# Patient Record
Sex: MALE | Race: WHITE | NOT HISPANIC OR LATINO
[De-identification: names, ages, dates, MRNs, and addresses within clinical notes are randomized per-mention and may not be internally consistent; named-entity substitution may affect disease eponyms.]

---

## 2017-03-17 ENCOUNTER — TRANSCRIPTION ENCOUNTER (OUTPATIENT)
Age: 57
End: 2017-03-17

## 2017-04-04 ENCOUNTER — OUTPATIENT (OUTPATIENT)
Dept: OUTPATIENT SERVICES | Facility: HOSPITAL | Age: 57
LOS: 1 days | Discharge: HOME | End: 2017-04-04

## 2017-06-22 ENCOUNTER — OUTPATIENT (OUTPATIENT)
Dept: OUTPATIENT SERVICES | Facility: HOSPITAL | Age: 57
LOS: 1 days | Discharge: HOME | End: 2017-06-22

## 2017-06-22 DIAGNOSIS — I48.91 UNSPECIFIED ATRIAL FIBRILLATION: ICD-10-CM

## 2017-06-27 DIAGNOSIS — I10 ESSENTIAL (PRIMARY) HYPERTENSION: ICD-10-CM

## 2017-06-27 DIAGNOSIS — I25.10 ATHEROSCLEROTIC HEART DISEASE OF NATIVE CORONARY ARTERY WITHOUT ANGINA PECTORIS: ICD-10-CM

## 2017-06-27 DIAGNOSIS — E78.4 OTHER HYPERLIPIDEMIA: ICD-10-CM

## 2017-06-27 DIAGNOSIS — I48.91 UNSPECIFIED ATRIAL FIBRILLATION: ICD-10-CM

## 2017-06-27 DIAGNOSIS — R94.39 ABNORMAL RESULT OF OTHER CARDIOVASCULAR FUNCTION STUDY: ICD-10-CM

## 2017-06-28 DIAGNOSIS — I25.110 ATHEROSCLEROTIC HEART DISEASE OF NATIVE CORONARY ARTERY WITH UNSTABLE ANGINA PECTORIS: ICD-10-CM

## 2017-06-28 DIAGNOSIS — E78.00 PURE HYPERCHOLESTEROLEMIA, UNSPECIFIED: ICD-10-CM

## 2017-06-28 DIAGNOSIS — Z79.899 OTHER LONG TERM (CURRENT) DRUG THERAPY: ICD-10-CM

## 2017-10-02 ENCOUNTER — OUTPATIENT (OUTPATIENT)
Dept: OUTPATIENT SERVICES | Facility: HOSPITAL | Age: 57
LOS: 1 days | Discharge: HOME | End: 2017-10-02

## 2017-10-02 DIAGNOSIS — I48.91 UNSPECIFIED ATRIAL FIBRILLATION: ICD-10-CM

## 2017-10-02 DIAGNOSIS — Z00.00 ENCOUNTER FOR GENERAL ADULT MEDICAL EXAMINATION WITHOUT ABNORMAL FINDINGS: ICD-10-CM

## 2018-01-10 ENCOUNTER — TRANSCRIPTION ENCOUNTER (OUTPATIENT)
Age: 58
End: 2018-01-10

## 2018-03-03 ENCOUNTER — OUTPATIENT (OUTPATIENT)
Dept: OUTPATIENT SERVICES | Facility: HOSPITAL | Age: 58
LOS: 1 days | Discharge: HOME | End: 2018-03-03

## 2018-03-03 DIAGNOSIS — Z79.899 OTHER LONG TERM (CURRENT) DRUG THERAPY: ICD-10-CM

## 2018-03-03 DIAGNOSIS — E78.00 PURE HYPERCHOLESTEROLEMIA, UNSPECIFIED: ICD-10-CM

## 2018-03-03 DIAGNOSIS — I10 ESSENTIAL (PRIMARY) HYPERTENSION: ICD-10-CM

## 2018-03-03 DIAGNOSIS — R94.31 ABNORMAL ELECTROCARDIOGRAM [ECG] [EKG]: ICD-10-CM

## 2018-11-06 ENCOUNTER — OUTPATIENT (OUTPATIENT)
Dept: OUTPATIENT SERVICES | Facility: HOSPITAL | Age: 58
LOS: 1 days | Discharge: HOME | End: 2018-11-06

## 2018-11-06 DIAGNOSIS — Z00.00 ENCOUNTER FOR GENERAL ADULT MEDICAL EXAMINATION WITHOUT ABNORMAL FINDINGS: ICD-10-CM

## 2018-11-06 DIAGNOSIS — E78.2 MIXED HYPERLIPIDEMIA: ICD-10-CM

## 2018-11-06 DIAGNOSIS — I48.2 CHRONIC ATRIAL FIBRILLATION: ICD-10-CM

## 2018-11-12 ENCOUNTER — OUTPATIENT (OUTPATIENT)
Dept: OUTPATIENT SERVICES | Facility: HOSPITAL | Age: 58
LOS: 1 days | Discharge: HOME | End: 2018-11-12

## 2018-11-12 DIAGNOSIS — N39.0 URINARY TRACT INFECTION, SITE NOT SPECIFIED: ICD-10-CM

## 2019-05-22 ENCOUNTER — APPOINTMENT (OUTPATIENT)
Dept: CARDIOLOGY | Facility: CLINIC | Age: 59
End: 2019-05-22
Payer: COMMERCIAL

## 2019-05-22 PROCEDURE — 99214 OFFICE O/P EST MOD 30 MIN: CPT

## 2019-05-22 PROCEDURE — 93000 ELECTROCARDIOGRAM COMPLETE: CPT

## 2019-09-25 ENCOUNTER — APPOINTMENT (OUTPATIENT)
Dept: CARDIOLOGY | Facility: CLINIC | Age: 59
End: 2019-09-25
Payer: COMMERCIAL

## 2019-09-25 PROCEDURE — 99213 OFFICE O/P EST LOW 20 MIN: CPT

## 2019-09-25 PROCEDURE — 93000 ELECTROCARDIOGRAM COMPLETE: CPT

## 2019-11-03 ENCOUNTER — TRANSCRIPTION ENCOUNTER (OUTPATIENT)
Age: 59
End: 2019-11-03

## 2019-11-06 ENCOUNTER — APPOINTMENT (OUTPATIENT)
Dept: CARDIOLOGY | Facility: CLINIC | Age: 59
End: 2019-11-06
Payer: COMMERCIAL

## 2019-11-06 PROCEDURE — 93320 DOPPLER ECHO COMPLETE: CPT

## 2019-11-06 PROCEDURE — 93351 STRESS TTE COMPLETE: CPT

## 2019-11-06 PROCEDURE — 93325 DOPPLER ECHO COLOR FLOW MAPG: CPT

## 2020-03-04 ENCOUNTER — APPOINTMENT (OUTPATIENT)
Dept: CARDIOLOGY | Facility: CLINIC | Age: 60
End: 2020-03-04
Payer: COMMERCIAL

## 2020-03-04 PROCEDURE — 93000 ELECTROCARDIOGRAM COMPLETE: CPT

## 2020-03-04 PROCEDURE — 99213 OFFICE O/P EST LOW 20 MIN: CPT

## 2020-07-13 ENCOUNTER — APPOINTMENT (OUTPATIENT)
Dept: CARDIOLOGY | Facility: CLINIC | Age: 60
End: 2020-07-13
Payer: COMMERCIAL

## 2020-07-13 PROCEDURE — 93000 ELECTROCARDIOGRAM COMPLETE: CPT

## 2020-07-13 PROCEDURE — 99214 OFFICE O/P EST MOD 30 MIN: CPT

## 2020-09-30 ENCOUNTER — RECORD ABSTRACTING (OUTPATIENT)
Age: 60
End: 2020-09-30

## 2020-11-16 ENCOUNTER — APPOINTMENT (OUTPATIENT)
Dept: CARDIOLOGY | Facility: CLINIC | Age: 60
End: 2020-11-16
Payer: COMMERCIAL

## 2020-11-16 VITALS
HEIGHT: 60 IN | HEART RATE: 72 BPM | BODY MASS INDEX: 36.52 KG/M2 | WEIGHT: 186 LBS | TEMPERATURE: 98.6 F | SYSTOLIC BLOOD PRESSURE: 120 MMHG | DIASTOLIC BLOOD PRESSURE: 88 MMHG

## 2020-11-16 PROCEDURE — 99072 ADDL SUPL MATRL&STAF TM PHE: CPT

## 2020-11-16 PROCEDURE — 99213 OFFICE O/P EST LOW 20 MIN: CPT

## 2020-11-16 PROCEDURE — 93000 ELECTROCARDIOGRAM COMPLETE: CPT

## 2020-11-16 RX ORDER — ASPIRIN 81 MG
81 TABLET, DELAYED RELEASE (ENTERIC COATED) ORAL DAILY
Refills: 0 | Status: ACTIVE | COMMUNITY

## 2021-01-13 ENCOUNTER — APPOINTMENT (OUTPATIENT)
Dept: CARDIOLOGY | Facility: CLINIC | Age: 61
End: 2021-01-13
Payer: COMMERCIAL

## 2021-01-13 PROCEDURE — 99072 ADDL SUPL MATRL&STAF TM PHE: CPT

## 2021-01-13 PROCEDURE — 93320 DOPPLER ECHO COMPLETE: CPT

## 2021-01-13 PROCEDURE — 93351 STRESS TTE COMPLETE: CPT

## 2021-01-13 PROCEDURE — 93325 DOPPLER ECHO COLOR FLOW MAPG: CPT

## 2021-05-25 ENCOUNTER — NON-APPOINTMENT (OUTPATIENT)
Age: 61
End: 2021-05-25

## 2021-06-15 ENCOUNTER — APPOINTMENT (OUTPATIENT)
Dept: CARDIOLOGY | Facility: CLINIC | Age: 61
End: 2021-06-15
Payer: COMMERCIAL

## 2021-06-15 VITALS
TEMPERATURE: 98.3 F | WEIGHT: 182 LBS | BODY MASS INDEX: 24.65 KG/M2 | SYSTOLIC BLOOD PRESSURE: 110 MMHG | HEART RATE: 73 BPM | HEIGHT: 72 IN | DIASTOLIC BLOOD PRESSURE: 80 MMHG

## 2021-06-15 PROCEDURE — 99072 ADDL SUPL MATRL&STAF TM PHE: CPT

## 2021-06-15 PROCEDURE — 93000 ELECTROCARDIOGRAM COMPLETE: CPT

## 2021-06-15 PROCEDURE — 99213 OFFICE O/P EST LOW 20 MIN: CPT

## 2021-10-04 ENCOUNTER — APPOINTMENT (OUTPATIENT)
Dept: CARDIOLOGY | Facility: CLINIC | Age: 61
End: 2021-10-04
Payer: COMMERCIAL

## 2021-10-04 VITALS
DIASTOLIC BLOOD PRESSURE: 80 MMHG | BODY MASS INDEX: 24.92 KG/M2 | HEIGHT: 72 IN | SYSTOLIC BLOOD PRESSURE: 110 MMHG | HEART RATE: 66 BPM | TEMPERATURE: 98 F | WEIGHT: 184 LBS

## 2021-10-04 DIAGNOSIS — Z00.00 ENCOUNTER FOR GENERAL ADULT MEDICAL EXAMINATION W/OUT ABNORMAL FINDINGS: ICD-10-CM

## 2021-10-04 PROCEDURE — 93000 ELECTROCARDIOGRAM COMPLETE: CPT

## 2021-10-04 PROCEDURE — 99213 OFFICE O/P EST LOW 20 MIN: CPT

## 2021-10-21 ENCOUNTER — EMERGENCY (EMERGENCY)
Facility: HOSPITAL | Age: 61
LOS: 0 days | Discharge: HOME | End: 2021-10-21
Attending: EMERGENCY MEDICINE | Admitting: EMERGENCY MEDICINE
Payer: COMMERCIAL

## 2021-10-21 VITALS
HEART RATE: 70 BPM | SYSTOLIC BLOOD PRESSURE: 147 MMHG | RESPIRATION RATE: 18 BRPM | DIASTOLIC BLOOD PRESSURE: 92 MMHG | OXYGEN SATURATION: 99 % | TEMPERATURE: 98 F

## 2021-10-21 DIAGNOSIS — S61.011A LACERATION WITHOUT FOREIGN BODY OF RIGHT THUMB WITHOUT DAMAGE TO NAIL, INITIAL ENCOUNTER: ICD-10-CM

## 2021-10-21 DIAGNOSIS — M79.644 PAIN IN RIGHT FINGER(S): ICD-10-CM
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
## 2021-10-21 DIAGNOSIS — Z79.82 LONG TERM (CURRENT) USE OF ASPIRIN: ICD-10-CM

## 2021-10-21 DIAGNOSIS — I10 ESSENTIAL (PRIMARY) HYPERTENSION: ICD-10-CM
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
## 2021-10-21 DIAGNOSIS — E78.5 HYPERLIPIDEMIA, UNSPECIFIED: ICD-10-CM

## 2021-10-21 PROCEDURE — 12041 INTMD RPR N-HF/GENIT 2.5CM/<: CPT

## 2021-10-21 PROCEDURE — 99283 EMERGENCY DEPT VISIT LOW MDM: CPT | Mod: 25

## 2021-10-21 NOTE — ED PROVIDER NOTE - OBJECTIVE STATEMENT
60 y.o male presents to the ED for evaluation of R thumb lac.  cutting bread and sustained laceration.  Acute pain, throbbing, mild severity, no radiation of pain.   UTD on tdap.  no paresthesias or difficulty w/ ROM.

## 2021-10-21 NOTE — ED PROVIDER NOTE - ATTENDING CONTRIBUTION TO CARE
59 y/o m w/ pmhx of afibon asa 81 mg, htn, hld, presents with R thumb pad laceration after cutting bread and accidently cutting his finer.  No fever, chills, nausea, vomiting, numbness/tingling, decreased sensation, hearing a click/feeling a pop, foreign body, or any other trauma.     utd on tetanus.    on exam: WDWN appearing in nad, No swelling or ecchymoses. R thumb finger pad ~ .5 cm laceration, well approximated, no foreign body or tendon rupture in a blood less field. no erythema  or streaking, no warmth to palpation, no crepitus, induration, fluctuance, no discharge, no signs of trauma, no abscess, (+) soft compartments. Radial pulses 2/4 b/l. Cap refill < 2 seconds, No obvious bony deformity. Good finger oppilation, FROM of R wrist in flexion, extension, ulna and radial deviation. No snuff box tenderness.    FROM at PIP, DIP, MCP  in flexion and extension, abduction and adduction, no pain to palpation or weakness. FROM of R elbow in flexion, extensions, supination and pronation, and R shoulder in flexion, extension, abduction, and adduction with no pain to palpation.

## 2021-10-21 NOTE — ED PROVIDER NOTE - NSFOLLOWUPINSTRUCTIONS_ED_ALL_ED_FT

## 2021-10-21 NOTE — ED PROVIDER NOTE - PATIENT PORTAL LINK FT
You can access the FollowMyHealth Patient Portal offered by [facility] by registering at the following website: [email or web address]. By joining [employer]’s FollowMyHealth portal, you will also be able to view your health information using other applications (apps) compatible with our system.

## 2021-10-21 NOTE — ED PROVIDER NOTE - CARE PROVIDER_API CALL
Omar Schulte)  Orthopaedic Surgery  [address] [place] [address]  Phone: [phone]  Fax: [phone]  Follow Up Time: 1-3 Days

## 2021-10-21 NOTE — ED PROVIDER NOTE - PHYSICAL EXAMINATION
CONST: Well appearing in NAD  EYES:  Sclera and conjunctiva clear.  MS: mild TTP R thumb, FROM against resistance, no difficulty w/ ROM, cap refill < 2 seconds. radial pulses 2+ bilaterally  SKIN: 1.5 cm linear laceration distal R thumb, no FB, no active bleeding.   NEURO: R hand NVI

## 2021-10-21 NOTE — ED PROVIDER NOTE - PROGRESS NOTE DETAILS
ED Attending ASHISH Nazario  Wound copiously irrigated, lac repaired, wound care discussed in detail, pt aware of timing for suture removal, signs and symptoms to return for, understands can return here or pmd, will also follow up with ortho, pt comfortable, and agrees with plan, will follow up.

## 2021-10-21 NOTE — ED PROVIDER NOTE - CARE PLAN
Stopping Smoking: Care Instructions Your Care Instructions Cigarette smokers crave the nicotine in cigarettes. Giving it up is much harder than simply changing a habit. Your body has to stop craving the nicotine. It is hard to quit, but you can do it. There are many tools that people use to quit smoking. You may find that combining tools works best for you. There are several steps to quitting. First you get ready to quit. Then you get support to help you. After that, you learn new skills and behaviors to become a nonsmoker. For many people, a necessary step is getting and using medicine. Your doctor will help you set up the plan that best meets your needs. You may want to attend a smoking cessation program to help you quit smoking. When you choose a program, look for one that has proven success. Ask your doctor for ideas. You will greatly increase your chances of success if you take medicine as well as get counseling or join a cessation program. 
Some of the changes you feel when you first quit tobacco are uncomfortable. Your body will miss the nicotine at first, and you may feel short-tempered and grumpy. You may have trouble sleeping or concentrating. Medicine can help you deal with these symptoms. You may struggle with changing your smoking habits and rituals. The last step is the tricky one: Be prepared for the smoking urge to continue for a time. This is a lot to deal with, but keep at it. You will feel better. Follow-up care is a key part of your treatment and safety. Be sure to make and go to all appointments, and call your doctor if you are having problems. It's also a good idea to know your test results and keep a list of the medicines you take. How can you care for yourself at home? · Ask your family, friends, and coworkers for support. You have a better chance of quitting if you have help and support. 1 · Join a support group, such as Nicotine Anonymous, for people who are trying to quit smoking. · Consider signing up for a smoking cessation program, such as the American Lung Association's Freedom from Smoking program. 
· Get text messaging support. Go to the website at www.smokefree. gov to sign up for the [facility] program. 
· Set a quit date. Pick your date carefully so that it is not right in the middle of a big deadline or stressful time. Once you quit, do not even take a puff. Get rid of all ashtrays and lighters after your last cigarette. Clean your house and your clothes so that they do not smell of smoke. · Learn how to be a nonsmoker. Think about ways you can avoid those things that make you reach for a cigarette. ? Avoid situations that put you at greatest risk for smoking. For some people, it is hard to have a drink with friends without smoking. For others, they might skip a coffee break with coworkers who smoke. ? Change your daily routine. Take a different route to work or eat a meal in a different place. · Cut down on stress. Calm yourself or release tension by doing an activity you enjoy, such as reading a book, taking a hot bath, or gardening. · Talk to your doctor or pharmacist about nicotine replacement therapy, which replaces the nicotine in your body. You still get nicotine but you do not use tobacco. Nicotine replacement products help you slowly reduce the amount of nicotine you need. These products come in several forms, many of them available over-the-counter: ? Nicotine patches ? Nicotine gum and lozenges ? Nicotine inhaler · Ask your doctor about bupropion (Wellbutrin) or varenicline (Chantix), which are prescription medicines. They do not contain nicotine. They help you by reducing withdrawal symptoms, such as stress and anxiety. · Some people find hypnosis, acupuncture, and massage helpful for ending the smoking habit. · Eat a healthy diet and get regular exercise. Having healthy habits will help your body move past its craving for nicotine. · Be prepared to keep trying. Most people are not successful the first few times they try to quit. Do not get mad at yourself if you smoke again. Make a list of things you learned and think about when you want to try again, such as next week, next month, or next year. Where can you learn more? Go to http://www.gray.com/ Enter E228 in the search box to learn more about \"Stopping Smoking: Care Instructions. \" Current as of: March 12, 2020               Content Version: 12.6 © 8126-2658 [employer], [employer] ([place]). Care instructions adapted under license by [employer] (which disclaims liability or warranty for this information). If you have questions about a medical condition or this instruction, always ask your healthcare professional. [address] any warranty or liability for your use of this information. Principal Discharge DX:	Finger laceration

## 2021-10-21 NOTE — ED PROVIDER NOTE - NS ED ROS FT
CONST: No fever, chills or bodyaches  MS:  + thumb pain.   SKIN: + laceration   NEURO: No headache, dizziness, paresthesias

## 2022-02-16 ENCOUNTER — APPOINTMENT (OUTPATIENT)
Dept: CARDIOLOGY | Facility: CLINIC | Age: 62
End: 2022-02-16
Payer: COMMERCIAL

## 2022-02-16 PROCEDURE — 93000 ELECTROCARDIOGRAM COMPLETE: CPT | Mod: 59

## 2022-02-16 PROCEDURE — 99214 OFFICE O/P EST MOD 30 MIN: CPT

## 2022-02-16 PROCEDURE — 93246 EXT ECG>7D<15D RECORDING: CPT

## 2022-06-15 ENCOUNTER — APPOINTMENT (OUTPATIENT)
Dept: CARDIOLOGY | Facility: CLINIC | Age: 62
End: 2022-06-15
Payer: COMMERCIAL

## 2022-06-15 PROCEDURE — 93000 ELECTROCARDIOGRAM COMPLETE: CPT

## 2022-06-15 PROCEDURE — 99214 OFFICE O/P EST MOD 30 MIN: CPT

## 2022-08-09 ENCOUNTER — NON-APPOINTMENT (OUTPATIENT)
Age: 62
End: 2022-08-09

## 2022-08-10 ENCOUNTER — EMERGENCY (EMERGENCY)
Facility: HOSPITAL | Age: 62
LOS: 0 days | Discharge: HOME | End: 2022-08-10
Attending: EMERGENCY MEDICINE | Admitting: EMERGENCY MEDICINE

## 2022-08-10 VITALS
RESPIRATION RATE: 18 BRPM | HEIGHT: 72 IN | TEMPERATURE: 100 F | OXYGEN SATURATION: 97 % | SYSTOLIC BLOOD PRESSURE: 138 MMHG | HEART RATE: 115 BPM | DIASTOLIC BLOOD PRESSURE: 80 MMHG | WEIGHT: 182.98 LBS

## 2022-08-10 VITALS — TEMPERATURE: 99 F | RESPIRATION RATE: 18 BRPM | HEART RATE: 92 BPM | OXYGEN SATURATION: 98 %

## 2022-08-10 DIAGNOSIS — U07.1 COVID-19: ICD-10-CM

## 2022-08-10 DIAGNOSIS — J02.9 ACUTE PHARYNGITIS, UNSPECIFIED: ICD-10-CM

## 2022-08-10 DIAGNOSIS — J36 PERITONSILLAR ABSCESS: ICD-10-CM

## 2022-08-10 PROCEDURE — 99284 EMERGENCY DEPT VISIT MOD MDM: CPT

## 2022-08-10 RX ORDER — BENZOCAINE 10 %
1 GEL (GRAM) MUCOUS MEMBRANE ONCE
Refills: 0 | Status: COMPLETED | OUTPATIENT
Start: 2022-08-10 | End: 2022-08-10

## 2022-08-10 RX ADMIN — Medication 1 SPRAY(S): at 18:26

## 2022-08-10 NOTE — ED PROVIDER NOTE - CLINICAL SUMMARY MEDICAL DECISION MAKING FREE TEXT BOX
Peritonsillar Abscess.  Seen and evaluated by ENT.  s/p I&D with successful drainage of 2.5cc pus.  D/C with PO Abx, Medrol dose devin and NSAIDs for pain. Patient to f/u with Dr. Erazo in 1-2 weeks.  Return to ED if fevers >101.3, intractable pain/vomiting.

## 2022-08-10 NOTE — ED PROVIDER NOTE - PROGRESS NOTE DETAILS
: consulted ENT, drained at [facility], culture sent, dc on clindamycin as well as medrol devin and ENT follow up within 1 week

## 2022-08-10 NOTE — ED PROVIDER NOTE - NS ED ATTENDING STATEMENT MOD
This was a shared visit with the DAI. I reviewed and verified the documentation and independently performed the documented:

## 2022-08-10 NOTE — ED PROVIDER NOTE - PHYSICAL EXAMINATION
Physical Exam    Vital Signs: I have reviewed the initial vital signs.  Constitutional: well-nourished, appears stated age, no acute distress  Eyes: Conjunctiva pink, Sclera clear, PERRLA, EOMI.  Throat: uvula deviated to the L, tonsillar erythema and swelling, with R sided PTA noted as well as exudate noted on R   Cardiovascular: S1 and S2, regular rate, regular rhythm, well-perfused extremities, radial pulses equal and 2+  Respiratory: unlabored respiratory effort, clear to auscultation bilaterally no wheezing, rales and rhonchi  Gastrointestinal: soft, non-tender abdomen, no pulsatile mass, normal bowl sounds  Musculoskeletal: supple neck, no lower extremity edema, no midline tenderness  Integumentary: warm, dry, no rash  Neurologic: awake, alert, cranial nerves II-XII grossly intact, extremities’ motor and sensory functions grossly intact  Psychiatric: appropriate mood, appropriate affect

## 2022-08-10 NOTE — ED ADULT TRIAGE NOTE - CHIEF COMPLAINT QUOTE
PT sent in from urgent care for r/o airway abscess. Neck is swollen upon exam. 8/6 tested positive for COVID. PT was given Tylenol 1000mg for fever and decadron 10 IM. Pt reports difficulty swallowing PT sent in from urgent care for r/o airway abscess. Neck is swollen upon exam. 8/6 tested positive for COVID. PT was given Tylenol 1000mg for fever and decadron 10 IM. Pt reports difficulty swallowing.  Cleared by Dr. Blanchard in [facility] for [facility].

## 2022-08-10 NOTE — ED PROVIDER NOTE - PATIENT PORTAL LINK FT
You can access the FollowMyHealth Patient Portal offered by [facility] by registering at the following website: [email or web address]. By joining [employer]’s FollowMyHealth portal, you will also be able to view your health information using other applications (apps) compatible with our system.

## 2022-08-10 NOTE — ED PROVIDER NOTE - CARE PROVIDER_API CALL
Aidan Erazo)  Otolaryngology  [address] [place] [address], [address] [place] [address]  Phone: [phone]  Fax: [phone]  Follow Up Time: 1-3 Days

## 2022-08-10 NOTE — ED ADULT NURSE NOTE - CHIEF COMPLAINT QUOTE
PT sent in from urgent care for r/o airway abscess. Neck is swollen upon exam. 8/6 tested positive for COVID. PT was given Tylenol 1000mg for fever and decadron 10 IM. Pt reports difficulty swallowing.  Cleared by Dr. Blanchard in [facility] for [facility].

## 2022-08-10 NOTE — CONSULT NOTE ADULT - ASSESSMENT
60 y/o male with R peritonsillar abscess. s/p successful drainage of 2.5cc puss  - pain control  - antipyretic prn  - PO abx  - Medrol dose pack  - f/u with Dr. Erazo as o/p in 1-2 weeks  - Return to ED if fevers >101.3, intractable pain/vomiting  - Pt understands and agrees with plan.

## 2022-08-10 NOTE — CONSULT NOTE ADULT - SUBJECTIVE AND OBJECTIVE BOX
HPI:  62 y/o male, COVID+, presenting with throat discomfort x1 day. States he woke up with fever and dysphagia/odynophagia and hoarseness. Tmax 103 at home. Denies any difficulty breathing/SOB. Able to tolerate liquids with some discomfort. Pt was seen at an urgent care and given decadron prior to being sent to ED.    REVIEW OF SYSTEMS   [x] A ten-point review of systems was otherwise negative except as noted.    Allergies    No Known Allergies    MEDICATIONS:  Antiinfectives:     IV fluids:    Hematologic/Anticoagulation:    Pain medications/Neuro:    Endocrine Medications:     All other standing medications:     All other PRN medications:    Vital Signs Last 24 Hrs  T(C): 37.9 (10 Aug 2022 16:53), Max: 37.9 (10 Aug 2022 16:53)  T(F): 100.3 (10 Aug 2022 16:53), Max: 100.3 (10 Aug 2022 16:53)  HR: 115 (10 Aug 2022 16:53) (115 - 115)  BP: 138/80 (10 Aug 2022 16:53) (138/80 - 138/80)  RR: 18 (10 Aug 2022 16:53) (18 - 18)  SpO2: 97% (10 Aug 2022 16:53) (97% - 97%)    Parameters below as of 10 Aug 2022 16:53  Patient On (Oxygen Delivery Method): room air    PHYSICAL EXAM:  Gen: NAD, awake/alert. No drooling or pooling of secretions. No respiratory distress. No stridor or stertor. + hoarseness  Skin: Warm/pink. Non diaphoretic  Head: Normocephalic, Atraumatic  Face: No edema, erythema, or fluctuance. Parotid glands soft without mass  Eyes: EOMI, no scleral injection  Nose: Nares bilaterally patent, no discharge or bleeding.   Mouth: Oral mucosa moist/pink, uvula deviated to the L, + mild edema to R peritonsillar space. no fluctuance, minimal tenderness. (Pt. actually complaining of L throat pain)  Neck: Flat, supple, + b/l anterior lympadenopathy  Resp: Breathing easily, no wheezing  CV: extremities without edema  GI: Soft, nondistended   Musculoskeletal: moving all extremities x 4    Procedure: Posterior oropharynx sprayed with topical benzocaine for local anesthetic. Using 19g needle able to aspirate 2.5cc puss from R peritonsillar space. post procedure pt reports slight improvement of symptoms.

## 2022-08-10 NOTE — ED PROVIDER NOTE - NS ED ROS FT
Constitutional: (-) fever  Eyes/ENT: (-) blurry vision, (-) epistaxis (+) sore throat  Cardiovascular: (-) chest pain, (-) syncope  Respiratory: (-) cough, (-) shortness of breath  Gastrointestinal: (-) vomiting, (-) diarrhea  Musculoskeletal: (-) neck pain, (-) back pain, (-) joint pain  Integumentary: (-) rash, (-) edema  Neurological: (-) headache, (-) altered mental status  Psychiatric: (-) hallucinations  Allergic/Immunologic: (-) pruritus

## 2022-08-10 NOTE — ED PROVIDER NOTE - NSFOLLOWUPINSTRUCTIONS_ED_ALL_ED_FT
Follow up with your primary medical doctor and the ENT provided within 1 week    Pharyngitis    WHAT YOU NEED TO KNOW:    Pharyngitis, or sore throat, is inflammation of the tissues and structures in your pharynx (throat). Pharyngitis is most often caused by bacteria. It may also be caused by a cold or flu virus. Other causes include smoking, allergies, or acid reflux.     DISCHARGE INSTRUCTIONS:    Call 911 for any of the following:     You have trouble breathing or swallowing because your throat is swollen or sore.        Return to the emergency department if:     You are drooling because it hurts too much to swallow.      Your fever is higher than 102°F (39°C) or lasts longer than 3 days.      You are confused.      You taste blood in your throat.    Contact your healthcare provider if:     Your throat pain gets worse.      You have a painful lump in your throat that does not go away after 5 days.      Your symptoms do not improve after 5 days.      You have questions or concerns about your condition or care.    Medicines: Viral pharyngitis will go away on its own without treatment. Your sore throat should start to feel better in 3 to 5 days for both viral and bacterial infections. You may need any of the following:     Antibiotics treat a bacterial infection.      NSAIDs, such as ibuprofen, help decrease swelling, pain, and fever. NSAIDs can cause stomach bleeding or kidney problems in certain people. If you take blood thinner medicine, always ask your healthcare provider if NSAIDs are safe for you. Always read the medicine label and follow directions.      Acetaminophen decreases pain and fever. It is available without a doctor's order. Ask how much to take and how often to take it. Follow directions. Acetaminophen can cause liver damage if not taken correctly.      Take your medicine as directed. Contact your healthcare provider if you think your medicine is not helping or if you have side effects. Tell him or her if you are allergic to any medicine. Keep a list of the medicines, vitamins, and herbs you take. Include the amounts, and when and why you take them. Bring the list or the pill bottles to follow-up visits. Carry your medicine list with you in case of an emergency.    Manage your symptoms:     Gargle salt water. Mix ¼ teaspoon salt in an 8 ounce glass of warm water and gargle. This may help decrease swelling in your throat.      Drink liquids as directed. You may need to drink more liquids than usual. Liquids may help soothe your throat and prevent dehydration. Ask how much liquid to drink each day and which liquids are best for you.      Use a cool-steam humidifier to help moisten the air in your room and calm your cough.      Soothe your throat with cough drops, ice, soft foods, or popsicles.    Prevent the spread of pharyngitis: Cover your mouth and nose when you cough or sneeze. Do not share food or drinks. Wash your hands often. Use soap and water. If soap and water are unavailable, use an alcohol based hand .     Follow up with your healthcare provider as directed: Write down your questions so you remember to ask them during your visits.        © Copyright [employer] 2019 All illustrations and images included in CareNotes are the copyrighted property of [employer]M., Inc. or [employer].

## 2022-08-10 NOTE — ED PROVIDER NOTE - OBJECTIVE STATEMENT
Pt is a 61 year old male with no PMH, however currently COVID pos presents to ED with complaints of sore throat. Pt states went to [facility] today, diagnosed with possible PTA sent in for further evaluation. Pt states pain is moderate, non radiating R sided with no alleviating factors. Endorses trismus, trouble with secretions and muffeled voie. Denies fever, chills, bodyaches, chest pain, sob, abdominal pain, NVCD

## 2022-08-12 ENCOUNTER — EMERGENCY (EMERGENCY)
Facility: HOSPITAL | Age: 62
LOS: 0 days | Discharge: HOME | End: 2022-08-12
Attending: STUDENT IN AN ORGANIZED HEALTH CARE EDUCATION/TRAINING PROGRAM | Admitting: STUDENT IN AN ORGANIZED HEALTH CARE EDUCATION/TRAINING PROGRAM

## 2022-08-12 VITALS
WEIGHT: 182.98 LBS | RESPIRATION RATE: 18 BRPM | TEMPERATURE: 99 F | DIASTOLIC BLOOD PRESSURE: 87 MMHG | HEIGHT: 72 IN | HEART RATE: 82 BPM | SYSTOLIC BLOOD PRESSURE: 155 MMHG | OXYGEN SATURATION: 100 %

## 2022-08-12 DIAGNOSIS — U07.1 COVID-19: ICD-10-CM

## 2022-08-12 DIAGNOSIS — I48.91 UNSPECIFIED ATRIAL FIBRILLATION: ICD-10-CM

## 2022-08-12 DIAGNOSIS — J02.9 ACUTE PHARYNGITIS, UNSPECIFIED: ICD-10-CM

## 2022-08-12 PROCEDURE — 99281 EMR DPT VST MAYX REQ PHY/QHP: CPT

## 2022-08-12 PROCEDURE — 99284 EMERGENCY DEPT VISIT MOD MDM: CPT

## 2022-08-12 NOTE — ED PROVIDER NOTE - NS ED ATTENDING STATEMENT MOD
Attending with This was a shared visit with the DAI. I reviewed and verified the documentation and independently performed the documented:

## 2022-08-12 NOTE — ED PROVIDER NOTE - CARE PROVIDER_API CALL
Aidan Erazo)  Otolaryngology  [address] [place] [address], [address] [place] [address]  Phone: [phone]  Fax: [phone]  Scheduled Appointment: 08/23/2022 07:15 AM

## 2022-08-12 NOTE — ED ADULT NURSE NOTE - CHIEF COMPLAINT QUOTE
no
Pt c/o sore throat after having abscess in back of throat drained pt states "It hurts to swallow and talk now". Pt reports testing + for covid on 8/6/22, denies fever.

## 2022-08-12 NOTE — CONSULT NOTE ADULT - SUBJECTIVE AND OBJECTIVE BOX
Pt is a 62yo Male who presents with throat pain. Pt was recently seen in the ED for PTA, s/p drainage at the bedside, pt also COVID +. Pt states his pain is different then his throat pain from two days ago, feels like his neck is swollen. PT states he feels like he is having a hard time swallowing and feels he is getting dehydrated. Pt has been taking his abx and steroids. Pt still with hoarse voice, no SOB/diff breathing.     PAST MEDICAL & SURGICAL HISTORY:  A-fib    Allergies    No Known Allergies    Intolerances    SOCIAL HISTORY:    FAMILY HISTORY:      REVIEW OF SYSTEMS   [x] A ten-point review of systems was otherwise negative except as noted.  [ ] Due to altered mental status/intubation, subjective information were not able to be obtained from patient. History was obtained, to the extent possible, from review of the chart and collateral sources of information.    Vital Signs Last 24 Hrs  T(C): 37.2 (12 Aug 2022 07:56), Max: 37.2 (12 Aug 2022 07:56)  T(F): 98.9 (12 Aug 2022 07:56), Max: 98.9 (12 Aug 2022 07:56)  HR: 82 (12 Aug 2022 07:56) (82 - 82)  BP: 155/87 (12 Aug 2022 07:56) (155/87 - 155/87)  RR: 18 (12 Aug 2022 07:56) (18 - 18)  SpO2: 100% (12 Aug 2022 07:56) (100% - 100%)    Parameters below as of 12 Aug 2022 07:56  Patient On (Oxygen Delivery Method): room air      GEN: NAD, awake and alert. No drooling or pooling of secretions. No stridor or stertor. Good vocal quality, slight hoarseness.   SKIN: Good color, non diaphoretic.  HEENT: Oral mucosa pink and moist. + erythema to the post OP. tonsils WNL. peritonsillar space without edema or fluctuance. no TTP to the peritonsillar space. No erythema or edema noted to buccal mucosa, tongue, FOM, uvula. Uvula midline.   NECK: Trachea midline, Neck supple, mild TTP to B/L lateral neck, no cervical LAD.  RESP: No dyspnea, non-labored breathing. No use of accessory muscles.   CARDIO: +S1/S2  ABDO: Soft, NT.  EXT: LANTIGUA x 4

## 2022-08-12 NOTE — ED PROVIDER NOTE - ATTENDING APP SHARED VISIT CONTRIBUTION OF CARE
61M with PMH PTA s/p I&D on 8/10 with ENT, d/c on clinda + solumedrol who presents to [facility] for gradually worsening throat pain with decreased oral intake d/t pain. Reports coughing/choking with thin liquids. He called ENT for a f/u apt but was given 1 month apt. Denies drooling, fever, chest pain, sob.     vs noted, triage note reviewed    Gen - NAD, Head - NCAT, Pharynx - clear, MMM, mild L paratonsillar swelling, no exudates, mild L submandibular swelling, Heart - RRR, no m/g/r, Lungs - CTAB, no w/c/r, Abdomen - soft, NT, ND, Skin - No rash, Neuro - A&O x3, equal strength and sensation, non-focal exam    a/p:  L throat pain s/p I&D 2d ago with ENT  protecting airway  ENT consult, reassess

## 2022-08-12 NOTE — ED PROVIDER NOTE - PHYSICAL EXAMINATION
Gen: Alert, NAD, well appearing  Head: NC, AT, PERRL, EOMI, normal lids/conjunctiva  ENT: normal hearing. + mild erythema to throat with minimal fullness to right side of throat  Neck: +supple, no tenderness/meningismus,  Pulm: Bilateral BS, normal resp effort, no wheeze/stridor/retractions  CV: S1S2  Abd: soft, NT/ND  Skin: no rash, warm/dry  Neuro: AAOx3, no sensory/motor deficits

## 2022-08-12 NOTE — ED PROVIDER NOTE - ATTENDING CONTRIBUTION TO CARE
61M with PMH PTA s/p I&D on 8/10 with ENT, d/c on clinda + solumedrol who presents to [facility] for gradually worsening throat pain with decreased oral intake d/t pain. Reports coughing/choking with thin liquids. He called ENT for a f/u apt but was given 1 month apt. Denies drooling, fever, chest pain, sob.     vs noted, triage note reviewed    Gen - NAD, Head - NCAT, Pharynx - clear, MMM, mild L paratonsillar swelling, no exudates, mild L submandibular swelling, Heart - RRR, no m/g/r, Lungs - CTAB, no w/c/r, Abdomen - soft, NT, ND, Skin - No rash, Neuro - A&O x3, equal strength and sensation, non-focal exam    a/p:  L throat pain s/p I&D 2d ago with ENT  protecting airway  ENT consult, reassess

## 2022-08-12 NOTE — ED ADULT TRIAGE NOTE - CHIEF COMPLAINT QUOTE
Pt c/o sore throat after having abscess in back of throat drained pt states "It hurts to swallow and talk now". Pt reports testing + for covid on 8/6/22, denies fever.

## 2022-08-12 NOTE — ED PROVIDER NOTE - PATIENT PORTAL LINK FT
You can access the FollowMyHealth Patient Portal offered by [facility] by registering at the following website: [email or web address]. By joining [employer]’s FollowMyHealth portal, you will also be able to view your health information using other applications (apps) compatible with our system.

## 2022-08-12 NOTE — ED PROVIDER NOTE - PROGRESS NOTE DETAILS
Seen by ENT MAXIMO Lobato. Patient can be dc home. F/U appt now 8/23 at 7:15am.  Patient tolerated PO in ED.

## 2022-08-12 NOTE — ED PROVIDER NOTE - CLINICAL SUMMARY MEDICAL DECISION MAKING FREE TEXT BOX
61M with PMH PTA s/p I&D on 8/10 with ENT, d/c on clinda + solumedrol who presents to [facility] for gradually worsening throat pain with decreased oral intake d/t pain. Patient tolerating secretions, tolerating p.o. liquids.  Patient seen by ENT and appropriate for close follow-up. I have fully discussed the medical management and delivery of care with the patient. I have discussed any available labs, imaging and treatment options with the patient. All Questions answered at the bedside and printed copies of all results provided and recommended to review with PCP. Patient confirms understanding and has been given detailed return precautions. Patient instructed to return to the ED should symptoms persist or worsen. Patient has demonstrated capacity and has verbalized understanding. Patient is well appearing upon discharge, ambulatory with a steady gait.

## 2022-08-12 NOTE — ED PROVIDER NOTE - OBJECTIVE STATEMENT
62 yo M hx of A-fib c/o sore throat. Patient was diagnosed with Covid 8/6. He has had sore throat for 4 days. Seen in ED 2 days ago  and had I&D of PTA and was discharged home with abx and steroids. He still has throat pain and difficulty swallowing. He called ENT and was given a f/u appt for 1 month later. No fevers.

## 2022-08-12 NOTE — CONSULT NOTE ADULT - ASSESSMENT
Pt is a 62yo Male who presents with throat pain. Pt was recently seen in the ED for PTA, s/p drainage at the bedside, pt also COVID +. No evidence of recurrent PTA, likely pharyngitis from COVID.     ·	if unable to tolerate PO, consider IV and bolus x 1  ·	cont abx  ·	cont supportive care  ·	no acute ENT intervention  ·	s/w ED team, w/d with attng

## 2022-08-15 ENCOUNTER — NON-APPOINTMENT (OUTPATIENT)
Age: 62
End: 2022-08-15

## 2022-08-19 ENCOUNTER — NON-APPOINTMENT (OUTPATIENT)
Age: 62
End: 2022-08-19

## 2022-08-23 ENCOUNTER — APPOINTMENT (OUTPATIENT)
Dept: OTOLARYNGOLOGY | Facility: CLINIC | Age: 62
End: 2022-08-23

## 2022-08-23 ENCOUNTER — NON-APPOINTMENT (OUTPATIENT)
Age: 62
End: 2022-08-23

## 2022-08-23 VITALS — WEIGHT: 172 LBS | BODY MASS INDEX: 23.3 KG/M2 | HEIGHT: 72 IN

## 2022-08-23 DIAGNOSIS — J40 BRONCHITIS, NOT SPECIFIED AS ACUTE OR CHRONIC: ICD-10-CM

## 2022-08-23 DIAGNOSIS — J01.90 ACUTE SINUSITIS, UNSPECIFIED: ICD-10-CM

## 2022-08-23 PROBLEM — I48.91 UNSPECIFIED ATRIAL FIBRILLATION: Chronic | Status: ACTIVE | Noted: 2022-08-12

## 2022-08-23 PROCEDURE — 99204 OFFICE O/P NEW MOD 45 MIN: CPT | Mod: 25

## 2022-08-23 PROCEDURE — 31575 DIAGNOSTIC LARYNGOSCOPY: CPT

## 2022-08-23 NOTE — PHYSICAL EXAM
[Nasal Endoscopy Performed] : nasal endoscopy was performed, see procedure section for findings [Normal] : mucosa is normal [Midline] : trachea located in midline position [de-identified] : purulent drainage right tonsils

## 2022-08-23 NOTE — REASON FOR VISIT
[Initial Evaluation] : an initial evaluation for [FreeTextEntry2] : Pharyngeal abscess and sinus issues

## 2022-08-23 NOTE — HISTORY OF PRESENT ILLNESS
[de-identified] : Patient presents today c/o Pharyngeal abscess.  He  was  in ED for Pharyngeal abscess 8/10/22 .  Minimal  improvement since  ED  visit.  Discharge with clindamycin  and  steroids, no imaging performed in ED.  Has regain  appetite .  Discomfort when swallowing . Occasionally coughing and gagging . Pt was able to sleep last night and has some improvement this morning.

## 2022-08-25 ENCOUNTER — APPOINTMENT (OUTPATIENT)
Dept: OTOLARYNGOLOGY | Facility: CLINIC | Age: 62
End: 2022-08-25

## 2022-08-25 DIAGNOSIS — J02.9 ACUTE PHARYNGITIS, UNSPECIFIED: ICD-10-CM

## 2022-08-25 DIAGNOSIS — R13.10 DYSPHAGIA, UNSPECIFIED: ICD-10-CM

## 2022-08-25 PROCEDURE — 99214 OFFICE O/P EST MOD 30 MIN: CPT | Mod: 25

## 2022-08-25 PROCEDURE — 31575 DIAGNOSTIC LARYNGOSCOPY: CPT

## 2022-08-25 NOTE — REASON FOR VISIT
[Subsequent Evaluation] : a subsequent evaluation for [FreeTextEntry2] :  Pharyngitis and bronchitis.

## 2022-08-25 NOTE — HISTORY OF PRESENT ILLNESS
[FreeTextEntry1] : Patient returns today for follow up Pharyngitis ad bronchitis. Patient started medication and is feeling much better.

## 2022-08-27 ENCOUNTER — RX RENEWAL (OUTPATIENT)
Age: 62
End: 2022-08-27

## 2022-08-31 LAB — EAR NOSE AND THROAT CULTURE: ABNORMAL

## 2022-09-22 ENCOUNTER — APPOINTMENT (OUTPATIENT)
Dept: OTOLARYNGOLOGY | Facility: CLINIC | Age: 62
End: 2022-09-22

## 2022-09-22 DIAGNOSIS — J38.4 EDEMA OF LARYNX: ICD-10-CM

## 2022-09-22 DIAGNOSIS — J39.1 OTHER ABSCESS OF PHARYNX: ICD-10-CM

## 2022-09-22 PROCEDURE — 31575 DIAGNOSTIC LARYNGOSCOPY: CPT

## 2022-09-22 PROCEDURE — 99213 OFFICE O/P EST LOW 20 MIN: CPT | Mod: 25

## 2022-11-09 ENCOUNTER — APPOINTMENT (OUTPATIENT)
Dept: CARDIOLOGY | Facility: CLINIC | Age: 62
End: 2022-11-09

## 2022-11-09 PROCEDURE — 99214 OFFICE O/P EST MOD 30 MIN: CPT | Mod: 25

## 2022-11-09 PROCEDURE — 93000 ELECTROCARDIOGRAM COMPLETE: CPT

## 2023-01-25 ENCOUNTER — RX RENEWAL (OUTPATIENT)
Age: 63
End: 2023-01-25

## 2023-03-08 ENCOUNTER — APPOINTMENT (OUTPATIENT)
Dept: CARDIOLOGY | Facility: CLINIC | Age: 63
End: 2023-03-08
Payer: COMMERCIAL

## 2023-03-08 PROCEDURE — 99214 OFFICE O/P EST MOD 30 MIN: CPT | Mod: 25

## 2023-03-08 PROCEDURE — 93000 ELECTROCARDIOGRAM COMPLETE: CPT

## 2023-04-30 ENCOUNTER — NON-APPOINTMENT (OUTPATIENT)
Age: 63
End: 2023-04-30

## 2023-07-05 ENCOUNTER — APPOINTMENT (OUTPATIENT)
Dept: CARDIOLOGY | Facility: CLINIC | Age: 63
End: 2023-07-05
Payer: COMMERCIAL

## 2023-07-05 DIAGNOSIS — I71.20 THORACIC AORTIC ANEURYSM, WITHOUT RUPTURE, UNSPECIFIED: ICD-10-CM

## 2023-07-05 DIAGNOSIS — R00.2 PALPITATIONS: ICD-10-CM

## 2023-07-05 PROCEDURE — 93320 DOPPLER ECHO COMPLETE: CPT

## 2023-07-05 PROCEDURE — 93325 DOPPLER ECHO COLOR FLOW MAPG: CPT

## 2023-07-05 PROCEDURE — 93351 STRESS TTE COMPLETE: CPT

## 2023-07-06 PROBLEM — R00.2 PALPITATIONS: Status: ACTIVE | Noted: 2022-02-18

## 2023-07-06 PROBLEM — I71.20 THORACIC AORTIC ANEURYSM WITHOUT RUPTURE: Status: ACTIVE | Noted: 2021-06-16

## 2023-09-29 ENCOUNTER — APPOINTMENT (OUTPATIENT)
Dept: HEMATOLOGY ONCOLOGY | Facility: CLINIC | Age: 63
End: 2023-09-29
Payer: COMMERCIAL

## 2023-09-29 ENCOUNTER — APPOINTMENT (OUTPATIENT)
Dept: INFUSION THERAPY | Facility: CLINIC | Age: 63
End: 2023-09-29
Payer: COMMERCIAL

## 2023-09-29 ENCOUNTER — OUTPATIENT (OUTPATIENT)
Dept: OUTPATIENT SERVICES | Facility: HOSPITAL | Age: 63
LOS: 1 days | End: 2023-09-29
Payer: COMMERCIAL

## 2023-09-29 VITALS
WEIGHT: 196 LBS | HEART RATE: 75 BPM | HEIGHT: 72 IN | TEMPERATURE: 98.4 F | BODY MASS INDEX: 26.55 KG/M2 | DIASTOLIC BLOOD PRESSURE: 71 MMHG | SYSTOLIC BLOOD PRESSURE: 131 MMHG

## 2023-09-29 DIAGNOSIS — Z86.79 PERSONAL HISTORY OF OTHER DISEASES OF THE CIRCULATORY SYSTEM: ICD-10-CM

## 2023-09-29 DIAGNOSIS — Z87.891 PERSONAL HISTORY OF NICOTINE DEPENDENCE: ICD-10-CM

## 2023-09-29 DIAGNOSIS — D50.8 OTHER IRON DEFICIENCY ANEMIAS: ICD-10-CM

## 2023-09-29 DIAGNOSIS — Z80.42 FAMILY HISTORY OF MALIGNANT NEOPLASM OF PROSTATE: ICD-10-CM

## 2023-09-29 DIAGNOSIS — D64.9 ANEMIA, UNSPECIFIED: ICD-10-CM

## 2023-09-29 PROCEDURE — 99204 OFFICE O/P NEW MOD 45 MIN: CPT

## 2023-09-30 DIAGNOSIS — D64.9 ANEMIA, UNSPECIFIED: ICD-10-CM

## 2023-10-06 ENCOUNTER — APPOINTMENT (OUTPATIENT)
Dept: INFUSION THERAPY | Facility: CLINIC | Age: 63
End: 2023-10-06

## 2023-10-06 ENCOUNTER — OUTPATIENT (OUTPATIENT)
Dept: OUTPATIENT SERVICES | Facility: HOSPITAL | Age: 63
LOS: 1 days | End: 2023-10-06
Payer: COMMERCIAL

## 2023-10-06 DIAGNOSIS — D64.9 ANEMIA, UNSPECIFIED: ICD-10-CM

## 2023-10-06 PROCEDURE — 96365 THER/PROPH/DIAG IV INF INIT: CPT

## 2023-10-06 PROCEDURE — 96367 TX/PROPH/DG ADDL SEQ IV INF: CPT

## 2023-10-06 RX ORDER — IRON SUCROSE 20 MG/ML
200 INJECTION, SOLUTION INTRAVENOUS ONCE
Refills: 0 | Status: COMPLETED | OUTPATIENT
Start: 2023-10-06 | End: 2023-10-06

## 2023-10-06 RX ADMIN — Medication 104 MILLIGRAM(S): at 16:32

## 2023-10-06 RX ADMIN — IRON SUCROSE 200 MILLIGRAM(S): 20 INJECTION, SOLUTION INTRAVENOUS at 17:30

## 2023-10-06 RX ADMIN — Medication 125 MILLIGRAM(S): at 16:55

## 2023-10-06 RX ADMIN — IRON SUCROSE 220 MILLIGRAM(S): 20 INJECTION, SOLUTION INTRAVENOUS at 17:00

## 2023-10-07 DIAGNOSIS — D64.9 ANEMIA, UNSPECIFIED: ICD-10-CM

## 2023-10-13 ENCOUNTER — LABORATORY RESULT (OUTPATIENT)
Age: 63
End: 2023-10-13

## 2023-10-13 ENCOUNTER — APPOINTMENT (OUTPATIENT)
Dept: INFUSION THERAPY | Facility: CLINIC | Age: 63
End: 2023-10-13

## 2023-10-13 ENCOUNTER — OUTPATIENT (OUTPATIENT)
Dept: OUTPATIENT SERVICES | Facility: HOSPITAL | Age: 63
LOS: 1 days | End: 2023-10-13
Payer: COMMERCIAL

## 2023-10-13 DIAGNOSIS — D64.9 ANEMIA, UNSPECIFIED: ICD-10-CM

## 2023-10-13 PROCEDURE — 96375 TX/PRO/DX INJ NEW DRUG ADDON: CPT

## 2023-10-13 PROCEDURE — 96365 THER/PROPH/DIAG IV INF INIT: CPT

## 2023-10-13 PROCEDURE — 36415 COLL VENOUS BLD VENIPUNCTURE: CPT

## 2023-10-13 PROCEDURE — 85027 COMPLETE CBC AUTOMATED: CPT

## 2023-10-13 RX ORDER — IRON SUCROSE 20 MG/ML
200 INJECTION, SOLUTION INTRAVENOUS ONCE
Refills: 0 | Status: COMPLETED | OUTPATIENT
Start: 2023-10-13 | End: 2023-10-13

## 2023-10-13 RX ADMIN — Medication 104 MILLIGRAM(S): at 16:25

## 2023-10-13 RX ADMIN — Medication 125 MILLIGRAM(S): at 16:55

## 2023-10-13 RX ADMIN — IRON SUCROSE 200 MILLIGRAM(S): 20 INJECTION, SOLUTION INTRAVENOUS at 16:21

## 2023-10-13 RX ADMIN — IRON SUCROSE 220 MILLIGRAM(S): 20 INJECTION, SOLUTION INTRAVENOUS at 16:08

## 2023-10-14 LAB
HCT VFR BLD CALC: 42.7 %
HGB BLD-MCNC: 13.8 G/DL
MCHC RBC-ENTMCNC: 24.3 PG
MCHC RBC-ENTMCNC: 32.3 G/DL
MCV RBC AUTO: 75.3 FL
PLATELET # BLD AUTO: 178 K/UL
PMV BLD: 11 FL
RBC # BLD: 5.67 M/UL
RBC # FLD: 15.5 %
WBC # FLD AUTO: 9.37 K/UL

## 2023-10-20 ENCOUNTER — APPOINTMENT (OUTPATIENT)
Dept: INFUSION THERAPY | Facility: CLINIC | Age: 63
End: 2023-10-20

## 2023-10-20 ENCOUNTER — OUTPATIENT (OUTPATIENT)
Dept: OUTPATIENT SERVICES | Facility: HOSPITAL | Age: 63
LOS: 1 days | End: 2023-10-20
Payer: COMMERCIAL

## 2023-10-20 DIAGNOSIS — D64.9 ANEMIA, UNSPECIFIED: ICD-10-CM

## 2023-10-20 PROCEDURE — 96367 TX/PROPH/DG ADDL SEQ IV INF: CPT

## 2023-10-20 PROCEDURE — 96365 THER/PROPH/DIAG IV INF INIT: CPT

## 2023-10-20 RX ORDER — IRON SUCROSE 20 MG/ML
200 INJECTION, SOLUTION INTRAVENOUS ONCE
Refills: 0 | Status: COMPLETED | OUTPATIENT
Start: 2023-10-20 | End: 2023-10-20

## 2023-10-20 RX ADMIN — IRON SUCROSE 220 MILLIGRAM(S): 20 INJECTION, SOLUTION INTRAVENOUS at 15:20

## 2023-10-20 RX ADMIN — Medication 104 MILLIGRAM(S): at 14:58

## 2023-10-20 RX ADMIN — Medication 125 MILLIGRAM(S): at 15:20

## 2023-10-20 RX ADMIN — IRON SUCROSE 200 MILLIGRAM(S): 20 INJECTION, SOLUTION INTRAVENOUS at 15:50

## 2023-10-27 ENCOUNTER — OUTPATIENT (OUTPATIENT)
Dept: OUTPATIENT SERVICES | Facility: HOSPITAL | Age: 63
LOS: 1 days | End: 2023-10-27
Payer: COMMERCIAL

## 2023-10-27 ENCOUNTER — APPOINTMENT (OUTPATIENT)
Dept: INFUSION THERAPY | Facility: CLINIC | Age: 63
End: 2023-10-27

## 2023-10-27 VITALS — DIASTOLIC BLOOD PRESSURE: 79 MMHG | HEART RATE: 63 BPM | SYSTOLIC BLOOD PRESSURE: 129 MMHG | TEMPERATURE: 97.6 F

## 2023-10-27 DIAGNOSIS — D64.9 ANEMIA, UNSPECIFIED: ICD-10-CM

## 2023-10-27 PROCEDURE — 96365 THER/PROPH/DIAG IV INF INIT: CPT

## 2023-10-27 PROCEDURE — 96367 TX/PROPH/DG ADDL SEQ IV INF: CPT

## 2023-10-27 RX ORDER — IRON SUCROSE 20 MG/ML
200 INJECTION, SOLUTION INTRAVENOUS ONCE
Refills: 0 | Status: COMPLETED | OUTPATIENT
Start: 2023-10-27 | End: 2023-10-27

## 2023-10-27 RX ADMIN — Medication 104 MILLIGRAM(S): at 14:19

## 2023-10-27 RX ADMIN — IRON SUCROSE 220 MILLIGRAM(S): 20 INJECTION, SOLUTION INTRAVENOUS at 14:42

## 2023-10-27 RX ADMIN — IRON SUCROSE 200 MILLIGRAM(S): 20 INJECTION, SOLUTION INTRAVENOUS at 15:12

## 2023-10-27 RX ADMIN — Medication 125 MILLIGRAM(S): at 14:40

## 2023-11-03 ENCOUNTER — APPOINTMENT (OUTPATIENT)
Dept: INFUSION THERAPY | Facility: CLINIC | Age: 63
End: 2023-11-03

## 2023-11-03 ENCOUNTER — OUTPATIENT (OUTPATIENT)
Dept: OUTPATIENT SERVICES | Facility: HOSPITAL | Age: 63
LOS: 1 days | End: 2023-11-03

## 2023-11-03 DIAGNOSIS — D64.9 ANEMIA, UNSPECIFIED: ICD-10-CM

## 2023-11-04 DIAGNOSIS — D64.9 ANEMIA, UNSPECIFIED: ICD-10-CM

## 2023-11-09 ENCOUNTER — APPOINTMENT (OUTPATIENT)
Dept: CARDIOLOGY | Facility: CLINIC | Age: 63
End: 2023-11-09
Payer: COMMERCIAL

## 2023-11-09 PROCEDURE — 93000 ELECTROCARDIOGRAM COMPLETE: CPT

## 2023-11-09 PROCEDURE — 99214 OFFICE O/P EST MOD 30 MIN: CPT | Mod: 25

## 2024-01-16 ENCOUNTER — LABORATORY RESULT (OUTPATIENT)
Age: 64
End: 2024-01-16

## 2024-01-16 ENCOUNTER — OUTPATIENT (OUTPATIENT)
Dept: OUTPATIENT SERVICES | Facility: HOSPITAL | Age: 64
LOS: 1 days | End: 2024-01-16
Payer: COMMERCIAL

## 2024-01-16 ENCOUNTER — APPOINTMENT (OUTPATIENT)
Dept: HEMATOLOGY ONCOLOGY | Facility: CLINIC | Age: 64
End: 2024-01-16
Payer: COMMERCIAL

## 2024-01-16 VITALS
DIASTOLIC BLOOD PRESSURE: 94 MMHG | SYSTOLIC BLOOD PRESSURE: 141 MMHG | HEIGHT: 72 IN | WEIGHT: 200 LBS | HEART RATE: 74 BPM | BODY MASS INDEX: 27.09 KG/M2 | TEMPERATURE: 97.5 F

## 2024-01-16 DIAGNOSIS — D50.8 OTHER IRON DEFICIENCY ANEMIAS: ICD-10-CM

## 2024-01-16 DIAGNOSIS — D64.9 ANEMIA, UNSPECIFIED: ICD-10-CM

## 2024-01-16 LAB
HCT VFR BLD CALC: 43.9 %
HGB BLD-MCNC: 15.5 G/DL
MCHC RBC-ENTMCNC: 28.1 PG
MCHC RBC-ENTMCNC: 35.3 G/DL
MCV RBC AUTO: 79.7 FL
PLATELET # BLD AUTO: 211 K/UL
PMV BLD: 9.6 FL
RBC # BLD: 5.51 M/UL
RBC # FLD: 13.8 %
WBC # FLD AUTO: 6.35 K/UL

## 2024-01-16 PROCEDURE — 82728 ASSAY OF FERRITIN: CPT

## 2024-01-16 PROCEDURE — 85027 COMPLETE CBC AUTOMATED: CPT

## 2024-01-16 PROCEDURE — 99213 OFFICE O/P EST LOW 20 MIN: CPT

## 2024-01-16 PROCEDURE — 36415 COLL VENOUS BLD VENIPUNCTURE: CPT

## 2024-01-16 NOTE — HISTORY OF PRESENT ILLNESS
[de-identified] : This is Fine, Aidan is 62 years old who presents here for initial Consultation for Iron Deficiency Anemia.    History of Present Illness     63 y/o M with PMHx of A-FIB S/P Ablation in 2015, he is on Aspirin daily, S/p EGD last month, Schedule for Colonoscopy in 10/5/23.   he complains of mild fatigue, no hematochezia, change in bowel habits, weight loss, bone pain, B symptoms, abdominal pain, vomiting, edema or SOB.   He is donating blood every 2 months for the last 3 years,  Most recent blood work from 9/5/23 with HGb is 13.4/42.4 , Low MCV is 76.1, normal WBCs and Platelet count.  Low Ferritin level is 9, Iron sat is 9%. Patient didn't tolerates the PO Iron, he developed pruritic rash with iron tablets.  [de-identified] : 1/16/24: Pt presents for follow up regarding his iron deficiency anemia. He had 4 doses of venofer 200 mg from 10/6/23 to 10/27/23. He had EGD/Colonoscopy done in 10/2023 and reportedly did not show any bleeds or polyps. Today, he has no complaint to offer. He further denies fever, blood in stool, chill, SOB, fatigue.

## 2024-01-16 NOTE — ASSESSMENT
[FreeTextEntry1] : 62 y/o M with history of A-FIB s/p ablation presents for follow up regarding his iron deficiency Anemia.  # Iron deficiency anemia likely due to frequency blood donation, resolved - unable to tolerate PO iron due to uricartia - EGD/Colonoscopy done in 10/5/23 and reportedly normal - s/p Venofer infusion weekly with Solu-Medrol 125 mg Iv (As a pre-medication) from 10/6/23 - 10/27/23 x 4 doses.  # A-FIB s/p ablation in 2015.  Plan: - CBC reviewed today shows resolution of anemia  - follow up ferritin level - pt may resume blood donation as long as CBC, iron studies with ferritin are routinely checked with PCP every 6 months   RTC PRN to see Dr. Hager.

## 2024-01-16 NOTE — CONSULT LETTER
[Dear  ___] : Dear  [unfilled], [Consult Letter:] : I had the pleasure of evaluating your patient, [unfilled]. [Please see my note below.] : Please see my note below. [Consult Closing:] : Thank you very much for allowing me to participate in the care of this patient.  If you have any questions, please do not hesitate to contact me. [Sincerely,] : Sincerely, [FreeTextEntry3] : Dr. YANNI Hager

## 2024-01-16 NOTE — REASON FOR VISIT
[Follow-Up Visit] : a follow-up visit for [FreeTextEntry2] : patient referred for Iron deficiency Anemia.

## 2024-01-17 DIAGNOSIS — D64.9 ANEMIA, UNSPECIFIED: ICD-10-CM

## 2024-01-17 LAB — FERRITIN SERPL-MCNC: 101 NG/ML

## 2024-02-07 ENCOUNTER — NON-APPOINTMENT (OUTPATIENT)
Age: 64
End: 2024-02-07

## 2024-03-19 ENCOUNTER — APPOINTMENT (OUTPATIENT)
Dept: CARDIOLOGY | Facility: CLINIC | Age: 64
End: 2024-03-19

## 2024-03-22 ENCOUNTER — APPOINTMENT (OUTPATIENT)
Dept: CARDIOLOGY | Facility: CLINIC | Age: 64
End: 2024-03-22
Payer: COMMERCIAL

## 2024-03-22 VITALS
HEIGHT: 72 IN | DIASTOLIC BLOOD PRESSURE: 80 MMHG | HEART RATE: 71 BPM | WEIGHT: 184 LBS | SYSTOLIC BLOOD PRESSURE: 110 MMHG | BODY MASS INDEX: 24.92 KG/M2

## 2024-03-22 DIAGNOSIS — I25.10 ATHEROSCLEROTIC HEART DISEASE OF NATIVE CORONARY ARTERY W/OUT ANGINA PECTORIS: ICD-10-CM

## 2024-03-22 DIAGNOSIS — I48.91 UNSPECIFIED ATRIAL FIBRILLATION: ICD-10-CM

## 2024-03-22 DIAGNOSIS — E78.5 HYPERLIPIDEMIA, UNSPECIFIED: ICD-10-CM

## 2024-03-22 PROCEDURE — G2211 COMPLEX E/M VISIT ADD ON: CPT

## 2024-03-22 PROCEDURE — 99214 OFFICE O/P EST MOD 30 MIN: CPT

## 2024-03-22 PROCEDURE — 93000 ELECTROCARDIOGRAM COMPLETE: CPT

## 2024-03-22 RX ORDER — ATORVASTATIN CALCIUM 20 MG/1
20 TABLET, FILM COATED ORAL
Qty: 90 | Refills: 3 | Status: ACTIVE | COMMUNITY
Start: 1900-01-01 | End: 1900-01-01

## 2024-03-22 RX ORDER — LEVOFLOXACIN 500 MG/1
500 TABLET, FILM COATED ORAL DAILY
Qty: 10 | Refills: 0 | Status: DISCONTINUED | COMMUNITY
Start: 2022-08-23 | End: 2024-03-22

## 2024-03-22 RX ORDER — PANTOPRAZOLE 40 MG/1
40 TABLET, DELAYED RELEASE ORAL
Qty: 90 | Refills: 0 | Status: DISCONTINUED | COMMUNITY
Start: 2022-08-25 | End: 2024-03-22

## 2024-03-22 NOTE — HISTORY OF PRESENT ILLNESS
[FreeTextEntry1] : 63-year-old male with a history of nonobstructive CAD, hyperlipidemia, atrial fibrillation (status post ablation in 2015) who presents for follow-up.  Since his last visit he has been doing well. He reports no chest pain, shortness of breath, lightheadedness, syncope, palpitations, orthopnea, PND or lower extremity edema. In the past he recalls experiencing some mild chest tightness when biking and really getting his heart rate up. He states typically this would not be too bothersome for him and he would slow down slightly and it would resolve. This has not recurred for many months. He continues to exercise regularly.  Labs 2/2023: , HDL 46, LDL 58. CMP WNL Stress echo 2023: No ischemia. 10.5 Mets. Normal LV size and function with an EF of 61%. Aortic root 4.0 cm. Left atrial enlargement. Holter 2022. No A. fib. Primary sinus rhythm. Rare SVT. Cardiac cath 2017: Nonobstructive disease. Cardiac CTA 2017: Calcium score 634. Severe stenosis in the mid LAD.

## 2024-03-22 NOTE — ASSESSMENT
[FreeTextEntry1] : --- 63-year-old male with a history of nonobstructive CAD, hyperlipidemia, atrial fibrillation (status post ablation in 2015) who presents for follow-up.  Nonobstructive CAD -Continue aspirin 81 mg daily -Continue atorvastatin 20 mg daily -Discussed lifestyle modifications including continued aerobic activity and eating a heart healthy diet  Hyperlipidemia -Continue atorvastatin 20 mg daily  History of atrial fibrillation status post ablation in 2015, no recurrence since (CHADSVASc 1, not on anticoagulation) -Continue diltiazem 240 mg daily  Return to clinic in 6 months

## 2024-03-31 NOTE — ED PROVIDER NOTE - IV ALTEPLASE EXCL ABS HIDDEN
Yovany Huddleston  Urology  [address] [place] [address]  Phone: [phone]  Fax: [phone]  Follow Up Time: Routine  
show

## 2024-09-20 ENCOUNTER — APPOINTMENT (OUTPATIENT)
Dept: CARDIOLOGY | Facility: CLINIC | Age: 64
End: 2024-09-20

## 2024-09-23 ENCOUNTER — APPOINTMENT (OUTPATIENT)
Dept: CARDIOLOGY | Facility: CLINIC | Age: 64
End: 2024-09-23
Payer: COMMERCIAL

## 2024-09-23 VITALS
HEART RATE: 67 BPM | HEIGHT: 72 IN | SYSTOLIC BLOOD PRESSURE: 118 MMHG | DIASTOLIC BLOOD PRESSURE: 70 MMHG | BODY MASS INDEX: 25.73 KG/M2 | WEIGHT: 190 LBS

## 2024-09-23 DIAGNOSIS — I25.119 ATHEROSCLEROTIC HEART DISEASE OF NATIVE CORONARY ARTERY WITH UNSPECIFIED ANGINA PECTORIS: ICD-10-CM

## 2024-09-23 DIAGNOSIS — I71.20 THORACIC AORTIC ANEURYSM, WITHOUT RUPTURE, UNSPECIFIED: ICD-10-CM

## 2024-09-23 DIAGNOSIS — E78.5 HYPERLIPIDEMIA, UNSPECIFIED: ICD-10-CM

## 2024-09-23 DIAGNOSIS — I48.91 UNSPECIFIED ATRIAL FIBRILLATION: ICD-10-CM

## 2024-09-23 PROCEDURE — 99214 OFFICE O/P EST MOD 30 MIN: CPT | Mod: 25

## 2024-09-23 PROCEDURE — 93000 ELECTROCARDIOGRAM COMPLETE: CPT

## 2024-09-23 NOTE — HISTORY OF PRESENT ILLNESS
[FreeTextEntry1] : 63-year-old male with a history of nonobstructive CAD, HLD, atrial fibrillation (status post ablation in 2015) who presents for follow-up.  Patient presents for follow up visit, previously seen by MD Pastor and MD Aguilar. Denies CP, SOB, dizziness, LE swelling, syncope. Reports very rare episodes of palpitations. Reports occasional chest tightness with exertion when biking states its only when his heart rate gets "too high", resolves with slowing down. Pt exercises regularly.  5/3/24 GFR 97  HDL 52 Trig 78 LDL 50 A1c 5.3.

## 2024-09-23 NOTE — REVIEW OF SYSTEMS
[Chest Discomfort] : chest discomfort [Negative] : Genitourinary [Lower Ext Edema] : no extremity edema [Leg Claudication] : no intermittent leg claudication [Palpitations] : no palpitations [Syncope] : no syncope [Anxiety] : no anxiety [FreeTextEntry5] : see HPI

## 2024-09-23 NOTE — CARDIOLOGY SUMMARY
[de-identified] : 9/23/24: NSR 67bpm  [de-identified] : Holter 2022. No A. fib. Primary sinus rhythm. Rare SVT.  [de-identified] : Stress echo 2023: No ischemia. 10.5 Mets. Normal LV size and function with an EF of 61%. Aortic root 4.0 cm. Left atrial enlargement. [de-identified] : Cardiac CTA 2017: Calcium score 634. Severe stenosis in the mid LAD. [de-identified] : Cardiac cath 2017: Nonobstructive disease.

## 2024-09-23 NOTE — ASSESSMENT
[FreeTextEntry1] : 63-year-old male with a history of nonobstructive CAD, hyperlipidemia, atrial fibrillation (status post ablation in 2015) who presents for follow-up. -CCTA 2017  Severe stenosis in mid LAD -Cath 2017 showed non obstructive disease. -S/E in 2023 showed no ischemia at 10 METs. CP now c/w with stable angina on exertion.  Hyperlipidemia controlled. History of atrial fibrillation status post ablation in 2015, no recurrence since (not on anticoagulation)  Plan: Continue ASA and statin. Continue Diltiazem. Patient advised to continue his exercise routine. Will need f/u stress test after next visit. Repeat BW ordered. F/u in 6 months.  Piotr Romano MD

## 2025-02-22 ENCOUNTER — NON-APPOINTMENT (OUTPATIENT)
Age: 65
End: 2025-02-22

## 2025-03-31 ENCOUNTER — APPOINTMENT (OUTPATIENT)
Dept: CARDIOLOGY | Facility: CLINIC | Age: 65
End: 2025-03-31
Payer: COMMERCIAL

## 2025-03-31 VITALS
HEIGHT: 72 IN | SYSTOLIC BLOOD PRESSURE: 110 MMHG | WEIGHT: 187 LBS | BODY MASS INDEX: 25.33 KG/M2 | DIASTOLIC BLOOD PRESSURE: 80 MMHG

## 2025-03-31 DIAGNOSIS — I48.91 UNSPECIFIED ATRIAL FIBRILLATION: ICD-10-CM

## 2025-03-31 DIAGNOSIS — I71.20 THORACIC AORTIC ANEURYSM, WITHOUT RUPTURE, UNSPECIFIED: ICD-10-CM

## 2025-03-31 DIAGNOSIS — E78.5 HYPERLIPIDEMIA, UNSPECIFIED: ICD-10-CM

## 2025-03-31 DIAGNOSIS — I25.10 ATHEROSCLEROTIC HEART DISEASE OF NATIVE CORONARY ARTERY W/OUT ANGINA PECTORIS: ICD-10-CM

## 2025-03-31 PROCEDURE — 99214 OFFICE O/P EST MOD 30 MIN: CPT | Mod: 25

## 2025-03-31 PROCEDURE — 93000 ELECTROCARDIOGRAM COMPLETE: CPT

## 2025-03-31 RX ORDER — IMIQUIMOD 50 MG/G
5 CREAM TOPICAL
Qty: 22 | Refills: 0 | Status: ACTIVE | COMMUNITY
Start: 2024-10-29